# Patient Record
Sex: FEMALE | Race: WHITE
[De-identification: names, ages, dates, MRNs, and addresses within clinical notes are randomized per-mention and may not be internally consistent; named-entity substitution may affect disease eponyms.]

---

## 2018-04-10 ENCOUNTER — HOSPITAL ENCOUNTER (OUTPATIENT)
Dept: HOSPITAL 92 - BICRAD | Age: 43
Discharge: HOME | End: 2018-04-10
Attending: FAMILY MEDICINE
Payer: MEDICARE

## 2018-04-10 DIAGNOSIS — Z98.1: ICD-10-CM

## 2018-04-10 DIAGNOSIS — M25.531: Primary | ICD-10-CM

## 2018-04-10 DIAGNOSIS — M85.80: ICD-10-CM

## 2018-11-29 ENCOUNTER — HOSPITAL ENCOUNTER (EMERGENCY)
Dept: HOSPITAL 9 - MADERS | Age: 43
Discharge: HOME | End: 2018-11-29
Payer: MEDICARE

## 2018-11-29 DIAGNOSIS — M06.9: ICD-10-CM

## 2018-11-29 DIAGNOSIS — I10: ICD-10-CM

## 2018-11-29 DIAGNOSIS — S13.4XXA: Primary | ICD-10-CM
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
## 2018-11-29 DIAGNOSIS — F32.9: ICD-10-CM

## 2018-11-29 DIAGNOSIS — Z79.899: ICD-10-CM

## 2018-11-29 PROCEDURE — 72070 X-RAY EXAM THORAC SPINE 2VWS: CPT

## 2018-11-29 PROCEDURE — 72100 X-RAY EXAM L-S SPINE 2/3 VWS: CPT

## 2018-11-29 PROCEDURE — 72040 X-RAY EXAM NECK SPINE 2-3 VW: CPT
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
## 2019-02-14 ENCOUNTER — HOSPITAL ENCOUNTER (OUTPATIENT)
Dept: HOSPITAL 92 - LABBT | Age: 44
Discharge: HOME | End: 2019-02-14
Attending: ORTHOPAEDIC SURGERY
Payer: MEDICARE

## 2019-02-14 DIAGNOSIS — S63.112A: ICD-10-CM

## 2019-02-14 DIAGNOSIS — Z01.818: Primary | ICD-10-CM

## 2019-02-14 LAB
ANION GAP SERPL CALC-SCNC: 13 MMOL/L (ref 10–20)
BACTERIA UR QL AUTO: (no result) HPF
BASOPHILS # BLD AUTO: 0 THOU/UL (ref 0–0.2)
BASOPHILS NFR BLD AUTO: 0.7 % (ref 0–1)
BUN SERPL-MCNC: 16 MG/DL (ref 7–18.7)
CALCIUM SERPL-MCNC: 8.6 MG/DL (ref 7.8–10.44)
CHLORIDE SERPL-SCNC: 105 MMOL/L (ref 98–107)
CO2 SERPL-SCNC: 24 MMOL/L (ref 22–29)
CREAT CL PREDICTED SERPL C-G-VRATE: 0 ML/MIN (ref 70–130)
CRYSTAL-AUWI FLAG: 130.5 (ref 0–15)
CRYSTALS URNS QL MICRO: (no result) HPF
EOSINOPHIL # BLD AUTO: 0.4 THOU/UL (ref 0–0.7)
EOSINOPHIL NFR BLD AUTO: 11.5 % (ref 0–10)
GLUCOSE SERPL-MCNC: 79 MG/DL (ref 70–105)
HEV IGM SER QL: 4 (ref 0–7.99)
HGB BLD-MCNC: 11.1 G/DL (ref 12–16)
HYALINE CASTS #/AREA URNS LPF: (no result) LPF
LYMPHOCYTES # BLD: 1.2 THOU/UL (ref 1.2–3.4)
LYMPHOCYTES NFR BLD AUTO: 32.1 % (ref 21–51)
MCH RBC QN AUTO: 26.7 PG (ref 27–31)
MCV RBC AUTO: 79.8 FL (ref 78–98)
MONOCYTES # BLD AUTO: 0.2 THOU/UL (ref 0.11–0.59)
MONOCYTES NFR BLD AUTO: 4.1 % (ref 0–10)
NEUTROPHILS # BLD AUTO: 1.9 THOU/UL (ref 1.4–6.5)
NEUTROPHILS NFR BLD AUTO: 51.6 % (ref 42–75)
PATHC CAST-AUWI FLAG: 0 (ref 0–2.49)
PLATELET # BLD AUTO: 162 THOU/UL (ref 130–400)
POTASSIUM SERPL-SCNC: 3.9 MMOL/L (ref 3.5–5.1)
RBC # BLD AUTO: 4.17 MILL/UL (ref 4.2–5.4)
RBC UR QL AUTO: (no result) HPF (ref 0–3)
SODIUM SERPL-SCNC: 138 MMOL/L (ref 136–145)
SP GR UR STRIP: 1.02 (ref 1–1.04)
SPERM-AUWI FLAG: 0 (ref 0–9.9)
WBC # BLD AUTO: 3.7 THOU/UL (ref 4.8–10.8)
WBC UR QL AUTO: (no result) HPF (ref 0–3)
YEAST-AUWI FLAG: 0 (ref 0–25)

## 2019-02-14 PROCEDURE — 93005 ELECTROCARDIOGRAM TRACING: CPT

## 2019-02-14 PROCEDURE — 81001 URINALYSIS AUTO W/SCOPE: CPT

## 2019-02-14 PROCEDURE — 93010 ELECTROCARDIOGRAM REPORT: CPT

## 2019-02-14 PROCEDURE — 80048 BASIC METABOLIC PNL TOTAL CA: CPT

## 2019-02-14 PROCEDURE — 85025 COMPLETE CBC W/AUTO DIFF WBC: CPT

## 2019-02-14 PROCEDURE — 85652 RBC SED RATE AUTOMATED: CPT

## 2019-02-14 PROCEDURE — 71046 X-RAY EXAM CHEST 2 VIEWS: CPT

## 2019-02-14 NOTE — RAD
CHEST 2 VIEWS:

 

COMPARISON: 

4/8/2004.

 

HISTORY: 

Preoperative exam.

 

FINDINGS: 

Normal cardiac silhouette.  The pulmonary vessels and hilum are normal.  Costophrenic angles are neil
r.  No consolidation or mass.  No pneumothorax or osseous abnormalities.

 

IMPRESSION: 

No acute cardiopulmonary process.

 

POS: [facility]

## 2019-02-25 ENCOUNTER — HOSPITAL ENCOUNTER (INPATIENT)
Dept: HOSPITAL 92 - SDC | Age: 44
LOS: 2 days | Discharge: HOME | DRG: 506 | End: 2019-02-27
Attending: ORTHOPAEDIC SURGERY | Admitting: ORTHOPAEDIC SURGERY
Payer: MEDICARE

## 2019-02-25 VITALS — BODY MASS INDEX: 24.4 KG/M2

## 2019-02-25 DIAGNOSIS — S63.265A: ICD-10-CM

## 2019-02-25 DIAGNOSIS — M15.4: Primary | ICD-10-CM

## 2019-02-25 DIAGNOSIS — G56.31: ICD-10-CM

## 2019-02-25 DIAGNOSIS — S63.261A: ICD-10-CM

## 2019-02-25 DIAGNOSIS — M19.042: ICD-10-CM

## 2019-02-25 DIAGNOSIS — T14.8XXA: ICD-10-CM

## 2019-02-25 DIAGNOSIS — S63.267A: ICD-10-CM

## 2019-02-25 PROCEDURE — C1776 JOINT DEVICE (IMPLANTABLE): HCPCS

## 2019-02-25 PROCEDURE — 76000 FLUOROSCOPY <1 HR PHYS/QHP: CPT

## 2019-02-25 PROCEDURE — S0020 INJECTION, BUPIVICAINE HYDRO: HCPCS

## 2019-02-25 RX ADMIN — ASPIRIN SCH MG: 81 TABLET ORAL at 21:56

## 2019-02-25 RX ADMIN — HYDROCODONE BITARTRATE AND ACETAMINOPHEN PRN TAB: 5; 325 TABLET ORAL at 22:09

## 2019-02-25 NOTE — RAD
LEFT HAND THREE FLUOROSCOPIC VIEWS:

 

History: Internal fixation procedure. Left hand arthroplasty. 

 

FINDINGS: 

Pins and wires securing the first MCP joint. 

 

POS: [facility]

## 2019-02-26 PROCEDURE — 0RGV0JZ FUSION OF LEFT METACARPOPHALANGEAL JOINT WITH SYNTHETIC SUBSTITUTE, OPEN APPROACH: ICD-10-PCS | Performed by: ORTHOPAEDIC SURGERY

## 2019-02-26 PROCEDURE — 0RQV0ZZ REPAIR LEFT METACARPOPHALANGEAL JOINT, OPEN APPROACH: ICD-10-PCS | Performed by: ORTHOPAEDIC SURGERY

## 2019-02-26 PROCEDURE — 0RBV0ZZ EXCISION OF LEFT METACARPOPHALANGEAL JOINT, OPEN APPROACH: ICD-10-PCS | Performed by: ORTHOPAEDIC SURGERY

## 2019-02-26 PROCEDURE — 0L880ZZ DIVISION OF LEFT HAND TENDON, OPEN APPROACH: ICD-10-PCS | Performed by: ORTHOPAEDIC SURGERY

## 2019-02-26 RX ADMIN — HYDROCODONE BITARTRATE AND ACETAMINOPHEN PRN TAB: 5; 325 TABLET ORAL at 11:08

## 2019-02-26 RX ADMIN — VANCOMYCIN HYDROCHLORIDE SCH MLS: 1 INJECTION, SOLUTION INTRAVENOUS at 16:30

## 2019-02-26 RX ADMIN — ASPIRIN SCH MG: 81 TABLET ORAL at 21:12

## 2019-02-26 RX ADMIN — HYDROCODONE BITARTRATE AND ACETAMINOPHEN PRN TAB: 5; 325 TABLET ORAL at 22:41

## 2019-02-26 RX ADMIN — ASPIRIN SCH MG: 81 TABLET ORAL at 08:08

## 2019-02-26 RX ADMIN — HYDROCODONE BITARTRATE AND ACETAMINOPHEN PRN TAB: 5; 325 TABLET ORAL at 02:38

## 2019-02-27 VITALS — TEMPERATURE: 98.5 F | SYSTOLIC BLOOD PRESSURE: 120 MMHG | DIASTOLIC BLOOD PRESSURE: 82 MMHG

## 2019-02-27 RX ADMIN — ASPIRIN SCH MG: 81 TABLET ORAL at 09:47

## 2019-02-27 RX ADMIN — VANCOMYCIN HYDROCHLORIDE SCH MLS: 1 INJECTION, SOLUTION INTRAVENOUS at 05:36

## 2019-02-27 NOTE — OP
DATE OF PROCEDURE:  02/25/2019



PREOPERATIVE DIAGNOSES:  

1. Left thumb metacarpophalangeal joint erosive arthritis.

2. Left index finger erosive arthritis with dislocation, ulnar drift, intrinsic

tightness, and extensor tendon subluxation, metacarpophalangeal joint level. 

3. Left middle finger erosive arthritis, metacarpophalangeal joint level with

intrinsic tightness; dislocation, volar and ulnar; and ulnar drift with extensor

tendon subluxation. 

4. Left ring finger erosive arthritis, metacarpophalangeal joint with;.

    a. Dislocation of the metacarpophalangeal joint.

    b. Ulnar drift, intrinsic tightness, and extensor tendon, ulnar subluxation.

5. Left small finger erosive arthritis, metacarpophalangeal joint with dislocation

of the metacarpophalangeal joint, ulnar and volar; ulnar drift; intrinsic tightness;

and extensor tendon ulnar subluxation. 



PROCEDURES PERFORMED:  

1. Left thumb metacarpophalangeal arthrodesis.

2. Left index finger;.

    a. Palmar capsule and metacarpophalangeal joint.

    b. Intrinsic release, ulnar aspect.

    c. Extensor tendon centralization/retinaculum reconstruction.

    d. Metacarpophalangeal joint silicone arthroplasty.

    e. C-arm supervision.

    f. Synovectomy.

3. Left middle finger. The exact same procedures were performed here in the exact

order as with the index finger. 

4. Left ring finger. The exact same procedures were performed as for the middle

finger and index finger in the exact order. 

5. Left small finger. The exact same procedures were performed as were performed in

the index, middle finger, and ring finger except for small finger had additional

small finger extensor tendon tenotomy. 



Also, there was application of long-arm splint, and there was C-arm supervision at

each finger. 



SPECIMENS REMOVED:  None that were kept or sent to the lab.



ESTIMATED BLOOD LOSS:  50 mL.



TOURNIQUET TIME:  130 minutes initially up with 35 minutes of tourniquet down and

additional 120 minutes tourniquet up. 



FINDINGS:  Very  eroded/erosive-type osteoarthritis.



IMPLANTS:  Implant arthroplasty with [employer] as follows:

1. Index finger MP joint size ____.

2. Middle finger MP joint size 2.

3. Ring finger MP joint size 1.

4. Small finger size  ____.



INDICATIONS FOR PROCEDURE:  The patient with very bad rheumatoid arthritis, who

reports that she has not been able to tolerate the new tissue/immuno-humeral therapy

medications of any type, and now reports progressive onset of ulnar drift, pain,

subluxation, popping, and poor ability to make a fist or open the digits for

activities of daily living. 



DESCRIPTION OF PROCEDURE:  After successful general LMA technique augmented by a

supraclavicular block, the patient had the limb prepped and draped. Time-out was

done for each finger individually. First, we pursued the thumb, where we

exsanguinated the limb, inflated the tourniquet to 250 mmHg pressure, made a zig-zag

incision centered over the MP joint, carried through skin and subcutaneous tissue.

Because of the obvious ulnar subluxation, we made an incision in the ulnar

retinaculum and brought the entire extensor mechanism radially, performed a complete

capsulotomy. We exposed the joint. We then used a troy to create a cup and cone

effect with the cone or concave area being on the  __________space and the convex

area being on the proximal phalanx. It must be noted that there was excellent

coadaptation. At this point, we used an 18-gauge needle converted to a drill guide

to drill holes parallel to the joint and 2 mm from the joint edge even after

resecting. We passed the cerclage-type wire  __________. We completed debridement of

the joint, and once this was done, we then placed a cerclage 24-gauge wire through

the previous drill hole site, held the joint in 20 degrees of palmar flexion and in

neutral alignment from the sagittal plane otherwise, and then drilled 2 K wires with

excellent purchase and did not expand the joint space. At this point, we then used

the machine to tie the 24-gauge wire, cut it below the skin after the radiographs

confirmed excellent position, and placed small amount of cancellous bone graft in

the dorsal aspect, which is now not as grafted as the palmar aspect because of the

positioning. 



We then placed the moist sponge in this area after closing the retinaculum with a

running 4-0 Prolene and tied. We turned attention to the index finger, where the

patient had such tremendous erosive change with loss of bone, marked synovitis that

we had to release and nearly perform a tenotomy over the extensor indicis proprius

because it was all the way in the gutter and ulnarly subluxated. We released the

ulnar side of the retinaculum for approximately 1 cm, then skipped a centimeter, and

then released the intrinsics on the ulnar side. We were able to bring the tendon

back up on top. We then completed the arthrotomy in a T-shape, debrided all the

joint involvement areas, and then visualized the metacarpal head and the base of the

phalanx. Here, we gently removed the collaterals releasing the ulna and saving the

slightly attenuated/stretched radial collateral ligament. We then made our saw cut

parallel to the joint line in the field at the metacarpal head, removed

approximately 3 mm of bone, made this as a box cut. I repeated the same on the

proximal phalanx base, removed osteophytes to create a box cut effect or close as

possible. We then began with the broach, under x-ray visualization making sure we

did __________. Once we saw that, we then began reaming with the  from

the [employer] set. The first one chosen was a 2 proximal and distal. This

gave excellent fit. Then, we placed a 3 proximal and distal. Once we had done this,

we then noticed that we had reached appropriate width by eyeball technique, and then

we placed the trial prosthesis inside. Once we had done this, we noticed it had

excellent  coadaptation, no subluxation, and into 95 degrees, there was no loss of

position. Then, we selected the same size 2, placed it in, and then we had to

radiograph it and clinically showed__________ . We then began by removing this

trial, getting the permanent size 2, placing the radial collateral ligament back to

the side of the metacarpal here using the drill hole technique with a heavy 4-0

Prolene. Once we had done this, we placed in the final prosthesis, and it was stable

in every way to include the radial collateral ligament, which was not the case

preoperatively. 



The patient then had the mirror image procedure performed to include the

synovectomy, implant arthroplasty, ulnar collateral ligament repair, synovectomy

with intrinsic release and tenotomy. The implants used were as follows; [employer], the index finger size  __________, middle finger size 2, ring finger size

1, and small finger size  __________. The tourniquet was deflated. Then, all wounds

were closed with simple interrupted mattress suture, and the patient left the

operating room in a splint in appropriate position for the joints of -20 extension,

and no evidence of anesthetic or operative complication. 

After this, we then placed a bulky dressing, bacitracin, Adaptic, 4x4, Kerlix, and

multiple passes between the digits. With this in mind, the long-arm splint was

placed in the palmar position. Separate splints for the thumb, as for the remaining

digits. The patient will now follow up with us in 1 week. Diet is regular. 







Job ID:  [id number]

## 2019-04-30 ENCOUNTER — HOSPITAL ENCOUNTER (EMERGENCY)
Dept: HOSPITAL 92 - ERS | Age: 44
Discharge: HOME | End: 2019-04-30
Payer: MEDICARE

## 2019-04-30 ENCOUNTER — HOSPITAL ENCOUNTER (OUTPATIENT)
Dept: HOSPITAL 92 - SDC | Age: 44
Discharge: HOME | End: 2019-04-30
Attending: ORTHOPAEDIC SURGERY
Payer: MEDICARE

## 2019-04-30 VITALS — BODY MASS INDEX: 23.6 KG/M2

## 2019-04-30 DIAGNOSIS — S63.265A: ICD-10-CM

## 2019-04-30 DIAGNOSIS — T84.028A: Primary | ICD-10-CM

## 2019-04-30 DIAGNOSIS — M06.9: ICD-10-CM

## 2019-04-30 DIAGNOSIS — S63.267A: ICD-10-CM

## 2019-04-30 DIAGNOSIS — T78.40XA: Primary | ICD-10-CM

## 2019-04-30 DIAGNOSIS — F32.9: ICD-10-CM

## 2019-04-30 DIAGNOSIS — E86.0: ICD-10-CM

## 2019-04-30 LAB
ALBUMIN SERPL BCG-MCNC: 3.6 G/DL (ref 3.5–5)
ALP SERPL-CCNC: 96 U/L (ref 40–150)
ALT SERPL W P-5'-P-CCNC: 16 U/L (ref 8–55)
ANION GAP SERPL CALC-SCNC: 16 MMOL/L (ref 10–20)
AST SERPL-CCNC: 40 U/L (ref 5–34)
BASOPHILS # BLD AUTO: 0 THOU/UL (ref 0–0.2)
BASOPHILS # BLD AUTO: 0.1 THOU/UL (ref 0–0.2)
BASOPHILS NFR BLD AUTO: 0.1 % (ref 0–1)
BASOPHILS NFR BLD AUTO: 1.2 % (ref 0–1)
BILIRUB SERPL-MCNC: 0.6 MG/DL (ref 0.2–1.2)
BUN SERPL-MCNC: 18 MG/DL (ref 7–18.7)
CALCIUM SERPL-MCNC: 8.1 MG/DL (ref 7.8–10.44)
CHLORIDE SERPL-SCNC: 108 MMOL/L (ref 98–107)
CO2 SERPL-SCNC: 18 MMOL/L (ref 22–29)
CREAT CL PREDICTED SERPL C-G-VRATE: 0 ML/MIN (ref 70–130)
CRYSTAL-AUWI FLAG: 0 (ref 0–15)
EOSINOPHIL # BLD AUTO: 0 THOU/UL (ref 0–0.7)
EOSINOPHIL # BLD AUTO: 0.2 THOU/UL (ref 0–0.7)
EOSINOPHIL NFR BLD AUTO: 0 % (ref 0–10)
EOSINOPHIL NFR BLD AUTO: 3.4 % (ref 0–10)
GLOBULIN SER CALC-MCNC: 3 G/DL (ref 2.4–3.5)
GLUCOSE SERPL-MCNC: 148 MG/DL (ref 70–105)
HEV IGM SER QL: 0.1 (ref 0–7.99)
HGB BLD-MCNC: 11 G/DL (ref 12–16)
HGB BLD-MCNC: 15.6 G/DL (ref 12–16)
HYALINE CASTS #/AREA URNS LPF: (no result) LPF
LYMPHOCYTES # BLD: 1 THOU/UL (ref 1.2–3.4)
LYMPHOCYTES # BLD: 1.5 THOU/UL (ref 1.2–3.4)
LYMPHOCYTES NFR BLD AUTO: 15 % (ref 21–51)
LYMPHOCYTES NFR BLD AUTO: 29.7 % (ref 21–51)
MCH RBC QN AUTO: 26.5 PG (ref 27–31)
MCH RBC QN AUTO: 27.6 PG (ref 27–31)
MCV RBC AUTO: 82 FL (ref 78–98)
MCV RBC AUTO: 84.5 FL (ref 78–98)
MONOCYTES # BLD AUTO: 0.2 THOU/UL (ref 0.11–0.59)
MONOCYTES # BLD AUTO: 0.3 THOU/UL (ref 0.11–0.59)
MONOCYTES NFR BLD AUTO: 2.8 % (ref 0–10)
MONOCYTES NFR BLD AUTO: 5.4 % (ref 0–10)
NEUTROPHILS # BLD AUTO: 3 THOU/UL (ref 1.4–6.5)
NEUTROPHILS # BLD AUTO: 5.6 THOU/UL (ref 1.4–6.5)
NEUTROPHILS NFR BLD AUTO: 60.4 % (ref 42–75)
NEUTROPHILS NFR BLD AUTO: 82 % (ref 42–75)
PATHC CAST-AUWI FLAG: 0.13 (ref 0–2.49)
PLATELET # BLD AUTO: 127 THOU/UL (ref 130–400)
PLATELET # BLD AUTO: 181 THOU/UL (ref 130–400)
POTASSIUM SERPL-SCNC: 4.7 MMOL/L (ref 3.5–5.1)
RBC # BLD AUTO: 4.14 MILL/UL (ref 4.2–5.4)
RBC # BLD AUTO: 5.67 MILL/UL (ref 4.2–5.4)
RBC UR QL AUTO: (no result) HPF (ref 0–3)
SODIUM SERPL-SCNC: 137 MMOL/L (ref 136–145)
SP GR UR STRIP: 1.02 (ref 1–1.04)
SPERM-AUWI FLAG: 0 (ref 0–9.9)
WBC # BLD AUTO: 5 THOU/UL (ref 4.8–10.8)
WBC # BLD AUTO: 6.9 THOU/UL (ref 4.8–10.8)
WBC UR QL AUTO: (no result) HPF (ref 0–3)
YEAST-AUWI FLAG: 0 (ref 0–25)

## 2019-04-30 PROCEDURE — S0028 INJECTION, FAMOTIDINE, 20 MG: HCPCS

## 2019-04-30 PROCEDURE — C1776 JOINT DEVICE (IMPLANTABLE): HCPCS

## 2019-04-30 PROCEDURE — 0PSV04Z REPOSITION LEFT FINGER PHALANX WITH INTERNAL FIXATION DEVICE, OPEN APPROACH: ICD-10-PCS | Performed by: ORTHOPAEDIC SURGERY

## 2019-04-30 PROCEDURE — 96375 TX/PRO/DX INJ NEW DRUG ADDON: CPT

## 2019-04-30 PROCEDURE — 93005 ELECTROCARDIOGRAM TRACING: CPT

## 2019-04-30 PROCEDURE — 36415 COLL VENOUS BLD VENIPUNCTURE: CPT

## 2019-04-30 PROCEDURE — 73130 X-RAY EXAM OF HAND: CPT

## 2019-04-30 PROCEDURE — 80053 COMPREHEN METABOLIC PANEL: CPT

## 2019-04-30 PROCEDURE — 0RSV04Z REPOSITION LEFT METACARPOPHALANGEAL JOINT WITH INTERNAL FIXATION DEVICE, OPEN APPROACH: ICD-10-PCS | Performed by: ORTHOPAEDIC SURGERY

## 2019-04-30 PROCEDURE — 96374 THER/PROPH/DIAG INJ IV PUSH: CPT

## 2019-04-30 PROCEDURE — 96361 HYDRATE IV INFUSION ADD-ON: CPT

## 2019-04-30 PROCEDURE — 85025 COMPLETE CBC W/AUTO DIFF WBC: CPT

## 2019-04-30 PROCEDURE — 81001 URINALYSIS AUTO W/SCOPE: CPT

## 2019-04-30 PROCEDURE — 76000 FLUOROSCOPY <1 HR PHYS/QHP: CPT

## 2019-04-30 PROCEDURE — S0020 INJECTION, BUPIVICAINE HYDRO: HCPCS

## 2019-04-30 PROCEDURE — 26715 TREAT KNUCKLE DISLOCATION: CPT

## 2019-04-30 PROCEDURE — 96376 TX/PRO/DX INJ SAME DRUG ADON: CPT

## 2019-04-30 PROCEDURE — 99283 EMERGENCY DEPT VISIT LOW MDM: CPT

## 2019-04-30 NOTE — RAD
Exam:

Left hand 3 views:



HISTORY:

Status post arthroscopy.



FINDINGS:

Extensive postoperative changes are noted at the metacarpal phalangeal joints with a wire suture stab
ilizing the fifth metatarsal phalangeal joint region which appears to be dislocated/severely

subluxed as seen on these portable fluoroscopic spot images.



IMPRESSION:

Limited portable fluoroscopic spot images. Subluxation-dislocation of the fifth metacarpal phalangeal
 joint with some one wire suture.



Reported By: Denis Simon 

Electronically Signed:  4/30/2019 10:27 AM

## 2019-05-01 NOTE — OP
DATE OF PROCEDURE:  04/30/2019



PREOPERATIVE DIAGNOSES:  

1. Left ring finger and small finger MCP joint dislocation with failed total joint

arthroplasty using silastic implants, Lowe type. 

2. Subluxation, extensor mechanism ulnarly.

3. Dislocated metacarpophalangeal joints.



POSTOPERATIVE DIAGNOSES:  

1. Left ring finger and small finger MCP joint dislocation with failed total joint

arthroplasty using silastic implants, Lowe type. 

2. Subluxation, extensor mechanism ulnarly.

3. Dislocated metacarpophalangeal joints.



PROCEDURE:  Open reduction internal fixation of proximal phalanx fracture as part of

the procedure as well as C-arm supervision and splint application, short-arm static. 



ANESTHESIA:  General LMA technique by Anesthesia.



TOURNIQUET TIME:  Ninety-six minutes.



BLOOD LOSS:  Approximately 50 mL.



INJECTABLE:  Yes, 20 mL 0.5% Marcaine to the proximal metacarpophalangeal joint

block. 



INDICATIONS:  The patient is now approximately six weeks after initial procedure

when we noticed some increased difficulty with flexion of the small and ring fingers

and early increased ulnar angulation compared to the other two digits. Radiographs

show completely dislocated and subluxed along with dislodge without fracture of

metacarpophalangeal joint prostheses of the ring and small fingers, revision was

indicated. 



DESCRIPTION OF PROCEDURE:  After successful general LMA technique, the limb was

prepped and draped, we did the transverse injection of 20 mL of 0.5% Marcaine and

used the old incision extended it proximally and distally by 2 cm at the level of

the small finger metacarpophalangeal joint. We then carried it to a point from the

ulnar side of the small finger metacarpophalangeal joint to the ulnar side of the

middle finger metacarpophalangeal joint. We first approached the ring finger and

then released the tight ulnar intrinsic as well as the ulnar retinaculum, pulled

back the healed radial retinaculum repair and exposed the joint. We then removed the

implant. There was no fracture, we sized it again and then placed a slightly larger

size which fit well, went from a one to a two silastic implant. We removed

approximately 2 mm of bone, one over each of the joint, we freed the capsule and the

released intrinsics as well. We placed the trial, we pulled the cyst mechanism over

and it was stable from +10 to 95 degrees passive flexion. 



Once we put the prosthesis in, the permanent, it fit well. Radiographs confirmed

this and then we balanced the soft tissue by using 3-0 Prolene to repair the ulnar

retinaculum and the digit was almost straight. 



We then turned our attention to the small finger. We released the ulnar intrinsics

and retinaculum, elevated the central mechanism of the extensor digitorum communis

and extensor digiti minimi radially. We removed the dislocated prosthesis which was

not fractured. We then began to size and realized we would need to put a large size

in for more stability, but during the reaming, an inadvertent fracture of a 1 cm

long by 4 mm wide at its base triangular piece from the radial aspect of the base of

the proximal phalanx. We carefully then used two 24-gauge cerclage wires, placing

them onto the flexor mechanism on the bone underneath the neurovascular mechanism

and machine, tightening this in nearly anatomic position using the K-wire  in

order to not lose diameter in the shaft. We then continued to ream using curette at

this time from the neurosurgical curette set. The patient then had the C-arm brought

in the field, we confirmed the fracture line was excellent, then we removed

approximately 1 mm from the bony rim of the small finger. At this time, we found the

collateral ligament, placed it inside the shaft of the ulnar collateral and

centralized the reduction. After we did the trial, we performed the same with a

permanent prosthesis, at this time, sized up to a size one. There was no instability

and so we closed, tied the ulnar collateral ligament with 4-0 Prolene and the ulnar

retinaculum without overtightening it, the extensor mechanism being central with

interrupted 4-0 Prolene and RB-1 needle. 



The patient had stable all digits, flexion to 100 degrees and extension to +10 at

the MP joints. The tourniquet was deflated, hemostasis obtained, the wound was

closed in a running simple 4-0 nylon interrupted mattress pattern. Bulky dressing

was applied with the digits in approximately 30 degrees of MP joint flexion, full

extension at the PIP and DIP and supported palmar and dorsal. The patient left the

operating room with beautiful C-arm pictures and no evidence of anesthetic or

operative complication, except for the fracture. 







Job ID:  [id number]

## 2019-05-04 NOTE — EKG
Test Reason : 

Blood Pressure : ***/*** mmHG

Vent. Rate : 082 BPM     Atrial Rate : 082 BPM

   P-R Int : 100 ms          QRS Dur : 086 ms

    QT Int : 380 ms       P-R-T Axes : 017 053 015 degrees

   QTc Int : 443 ms

 

Sinus rhythm with short AR

Nonspecific ST and T wave abnormality

Abnormal ECG

 

Confirmed by SHELLEY GOVEA ([id number]),  [facility] ([id number]) on 5/4/2019 11:33:14 PM

 

Referred By:             Confirmed By:SHELLEY GOVEA

## 2022-02-08 ENCOUNTER — HOSPITAL ENCOUNTER (OUTPATIENT)
Dept: HOSPITAL 92 - BICRAD | Age: 47
Discharge: HOME | End: 2022-02-08
Attending: FAMILY MEDICINE
Payer: MEDICARE

## 2022-02-08 DIAGNOSIS — M54.50: Primary | ICD-10-CM

## 2022-02-08 DIAGNOSIS — M47.816: ICD-10-CM

## 2022-02-08 PROCEDURE — 72100 X-RAY EXAM L-S SPINE 2/3 VWS: CPT

## 2022-02-17 ENCOUNTER — HOSPITAL ENCOUNTER (OUTPATIENT)
Dept: HOSPITAL 92 - CSHMRI | Age: 47
Discharge: HOME | End: 2022-02-17
Attending: FAMILY MEDICINE
Payer: MEDICARE

## 2022-02-17 DIAGNOSIS — M54.50: Primary | ICD-10-CM

## 2022-02-17 DIAGNOSIS — M47.816: ICD-10-CM

## 2022-02-17 PROCEDURE — 72148 MRI LUMBAR SPINE W/O DYE: CPT

## 2022-06-19 ENCOUNTER — HOSPITAL ENCOUNTER (EMERGENCY)
Dept: HOSPITAL 92 - ERS | Age: 47
Discharge: HOME | End: 2022-06-19
Payer: MEDICARE

## 2022-06-19 DIAGNOSIS — M06.9: ICD-10-CM

## 2022-06-19 DIAGNOSIS — J40: Primary | ICD-10-CM

## 2022-06-19 DIAGNOSIS — Z20.822: ICD-10-CM

## 2022-06-19 LAB
ALBUMIN SERPL BCG-MCNC: 3.6 G/DL (ref 3.5–5)
ALP SERPL-CCNC: 101 U/L (ref 40–110)
ALT SERPL W P-5'-P-CCNC: 7 U/L (ref 8–55)
ANION GAP SERPL CALC-SCNC: 10 MMOL/L (ref 10–20)
AST SERPL-CCNC: 18 U/L (ref 5–34)
BASOPHILS # BLD AUTO: 0 THOU/UL (ref 0–0.2)
BASOPHILS NFR BLD AUTO: 0.1 % (ref 0–1)
BILIRUB SERPL-MCNC: 1.1 MG/DL (ref 0.2–1.2)
BUN SERPL-MCNC: 7 MG/DL (ref 7–18.7)
CALCIUM SERPL-MCNC: 8.9 MG/DL (ref 7.8–10.44)
CHLORIDE SERPL-SCNC: 102 MMOL/L (ref 98–107)
CO2 SERPL-SCNC: 26 MMOL/L (ref 22–29)
CREAT CL PREDICTED SERPL C-G-VRATE: 0 ML/MIN (ref 70–130)
EOSINOPHIL # BLD AUTO: 0.2 THOU/UL (ref 0–0.7)
EOSINOPHIL NFR BLD AUTO: 2.8 % (ref 0–10)
GLOBULIN SER CALC-MCNC: 3.7 G/DL (ref 2.4–3.5)
GLUCOSE SERPL-MCNC: 98 MG/DL (ref 70–105)
HGB BLD-MCNC: 11.9 G/DL (ref 12–16)
LYMPHOCYTES # BLD: 1 THOU/UL (ref 1.2–3.4)
LYMPHOCYTES NFR BLD AUTO: 16.9 % (ref 21–51)
MCH RBC QN AUTO: 28.7 PG (ref 27–31)
MCV RBC AUTO: 86.8 FL (ref 78–98)
MONOCYTES # BLD AUTO: 0.2 THOU/UL (ref 0.11–0.59)
MONOCYTES NFR BLD AUTO: 4.1 % (ref 0–10)
NEUTROPHILS # BLD AUTO: 4.4 THOU/UL (ref 1.4–6.5)
NEUTROPHILS NFR BLD AUTO: 76 % (ref 42–75)
PLATELET # BLD AUTO: 174 THOU/UL (ref 130–400)
POTASSIUM SERPL-SCNC: 3 MMOL/L (ref 3.5–5.1)
PREGS CONTROL BACKGROUND?: (no result)
PREGS CONTROL BAR APPEAR?: YES
PROT UR STRIP.AUTO-MCNC: 10 MG/DL
RBC # BLD AUTO: 4.13 MILL/UL (ref 4.2–5.4)
SODIUM SERPL-SCNC: 135 MMOL/L (ref 136–145)
SP GR UR STRIP: 1.02 (ref 1–1.04)
WBC # BLD AUTO: 5.8 THOU/UL (ref 4.8–10.8)

## 2022-06-19 PROCEDURE — U0005 INFEC AGEN DETEC AMPLI PROBE: HCPCS

## 2022-06-19 PROCEDURE — 36415 COLL VENOUS BLD VENIPUNCTURE: CPT

## 2022-06-19 PROCEDURE — 71045 X-RAY EXAM CHEST 1 VIEW: CPT

## 2022-06-19 PROCEDURE — 85379 FIBRIN DEGRADATION QUANT: CPT

## 2022-06-19 PROCEDURE — 83880 ASSAY OF NATRIURETIC PEPTIDE: CPT

## 2022-06-19 PROCEDURE — 84484 ASSAY OF TROPONIN QUANT: CPT

## 2022-06-19 PROCEDURE — 81003 URINALYSIS AUTO W/O SCOPE: CPT

## 2022-06-19 PROCEDURE — 80053 COMPREHEN METABOLIC PANEL: CPT

## 2022-06-19 PROCEDURE — 85025 COMPLETE CBC W/AUTO DIFF WBC: CPT

## 2022-06-19 PROCEDURE — U0003 INFECTIOUS AGENT DETECTION BY NUCLEIC ACID (DNA OR RNA); SEVERE ACUTE RESPIRATORY SYNDROME CORONAVIRUS 2 (SARS-COV-2) (CORONAVIRUS DISEASE [COVID-19]), AMPLIFIED PROBE TECHNIQUE, MAKING USE OF HIGH THROUGHPUT TECHNOLOGIES AS DESCRIBED BY CMS-2020-01-R: HCPCS

## 2022-06-19 PROCEDURE — 99284 EMERGENCY DEPT VISIT MOD MDM: CPT

## 2022-06-19 PROCEDURE — 84703 CHORIONIC GONADOTROPIN ASSAY: CPT

## 2022-06-19 PROCEDURE — 71275 CT ANGIOGRAPHY CHEST: CPT

## 2022-06-19 PROCEDURE — 93005 ELECTROCARDIOGRAM TRACING: CPT

## 2022-09-11 ENCOUNTER — HOSPITAL ENCOUNTER (EMERGENCY)
Dept: HOSPITAL 92 - ERS | Age: 47
Discharge: HOME | End: 2022-09-11
Payer: MEDICARE

## 2022-09-11 DIAGNOSIS — L03.114: Primary | ICD-10-CM

## 2022-09-11 PROCEDURE — 10060 I&D ABSCESS SIMPLE/SINGLE: CPT

## 2023-07-11 ENCOUNTER — HOSPITAL ENCOUNTER (EMERGENCY)
Dept: HOSPITAL 92 - ERS | Age: 48
Discharge: HOME | End: 2023-07-11
Payer: MEDICARE

## 2023-07-11 DIAGNOSIS — H66.92: Primary | ICD-10-CM

## 2023-07-11 DIAGNOSIS — J00: ICD-10-CM

## 2023-07-11 DIAGNOSIS — J32.9: ICD-10-CM

## 2023-07-11 DIAGNOSIS — J32.0: ICD-10-CM

## 2023-07-11 DIAGNOSIS — E78.5: ICD-10-CM

## 2023-07-11 PROCEDURE — 99283 EMERGENCY DEPT VISIT LOW MDM: CPT
